# Patient Record
Sex: MALE | Race: WHITE | NOT HISPANIC OR LATINO | Employment: OTHER | URBAN - METROPOLITAN AREA
[De-identification: names, ages, dates, MRNs, and addresses within clinical notes are randomized per-mention and may not be internally consistent; named-entity substitution may affect disease eponyms.]

---

## 2023-08-23 PROBLEM — I42.9 CARDIOMYOPATHY (MULTI): Status: ACTIVE | Noted: 2023-08-23

## 2023-08-23 PROBLEM — I10 HTN (HYPERTENSION): Status: ACTIVE | Noted: 2023-08-23

## 2023-08-23 PROBLEM — I48.20 CHRONIC ATRIAL FIBRILLATION (MULTI): Status: ACTIVE | Noted: 2023-08-23

## 2023-08-23 RX ORDER — AMLODIPINE BESYLATE 10 MG/1
10 TABLET ORAL DAILY
COMMUNITY
Start: 2019-04-29 | End: 2023-10-17 | Stop reason: SDUPTHER

## 2023-08-23 RX ORDER — NAPROXEN SODIUM 220 MG/1
81 TABLET, FILM COATED ORAL DAILY
COMMUNITY

## 2023-10-17 ENCOUNTER — OFFICE VISIT (OUTPATIENT)
Dept: CARDIOLOGY | Facility: CLINIC | Age: 83
End: 2023-10-17
Payer: MEDICARE

## 2023-10-17 VITALS
WEIGHT: 228 LBS | HEART RATE: 64 BPM | SYSTOLIC BLOOD PRESSURE: 126 MMHG | BODY MASS INDEX: 33.67 KG/M2 | DIASTOLIC BLOOD PRESSURE: 70 MMHG

## 2023-10-17 DIAGNOSIS — I48.0 PAROXYSMAL ATRIAL FIBRILLATION (MULTI): ICD-10-CM

## 2023-10-17 DIAGNOSIS — I51.81 STRESS-INDUCED CARDIOMYOPATHY: ICD-10-CM

## 2023-10-17 DIAGNOSIS — I10 PRIMARY HYPERTENSION: Primary | ICD-10-CM

## 2023-10-17 PROCEDURE — 3074F SYST BP LT 130 MM HG: CPT | Performed by: INTERNAL MEDICINE

## 2023-10-17 PROCEDURE — 99213 OFFICE O/P EST LOW 20 MIN: CPT | Performed by: INTERNAL MEDICINE

## 2023-10-17 PROCEDURE — 1159F MED LIST DOCD IN RCRD: CPT | Performed by: INTERNAL MEDICINE

## 2023-10-17 PROCEDURE — 1036F TOBACCO NON-USER: CPT | Performed by: INTERNAL MEDICINE

## 2023-10-17 PROCEDURE — 3078F DIAST BP <80 MM HG: CPT | Performed by: INTERNAL MEDICINE

## 2023-10-17 PROCEDURE — 1160F RVW MEDS BY RX/DR IN RCRD: CPT | Performed by: INTERNAL MEDICINE

## 2023-10-17 PROCEDURE — 1126F AMNT PAIN NOTED NONE PRSNT: CPT | Performed by: INTERNAL MEDICINE

## 2023-10-17 RX ORDER — AMLODIPINE BESYLATE 10 MG/1
10 TABLET ORAL DAILY
Qty: 30 TABLET | Refills: 11 | Status: SHIPPED | OUTPATIENT
Start: 2023-10-17 | End: 2024-10-16

## 2023-10-17 ASSESSMENT — PATIENT HEALTH QUESTIONNAIRE - PHQ9
SUM OF ALL RESPONSES TO PHQ9 QUESTIONS 1 & 2: 0
1. LITTLE INTEREST OR PLEASURE IN DOING THINGS: NOT AT ALL
2. FEELING DOWN, DEPRESSED OR HOPELESS: NOT AT ALL

## 2023-10-17 ASSESSMENT — ENCOUNTER SYMPTOMS
DEPRESSION: 0
OCCASIONAL FEELINGS OF UNSTEADINESS: 0
LOSS OF SENSATION IN FEET: 0

## 2023-10-17 ASSESSMENT — PAIN SCALES - GENERAL: PAINLEVEL: 0-NO PAIN

## 2023-10-17 NOTE — PROGRESS NOTES
Subjective   Akbar Espitia is a 83 y.o. male.    Chief Complaint:  Follow-up (6 mo follow up Afib)    HPI  Patient stable and doing well.  No palpitations or unusual shortness of breath.  Review of Systems   All other systems reviewed and are negative.      Objective   Constitutional:       Appearance: Not in distress.   Eyes:      Conjunctiva/sclera: Conjunctivae normal.   Neck:      Vascular: JVD normal.   Pulmonary:      Breath sounds: Normal breath sounds. No wheezing. No rhonchi. No rales.   Cardiovascular:      Normal rate. Regular rhythm.      Murmurs: There is no murmur.      No gallop.  No click. No rub.   Abdominal:      Palpations: Abdomen is soft.   Neurological:      General: No focal deficit present.      Mental Status: Alert.         Lab Review:       Assessment/Plan   The primary encounter diagnosis was Primary hypertension. Diagnoses of Paroxysmal atrial fibrillation (CMS/HCC) and Stress-induced cardiomyopathy were also pertinent to this visit.    Cardiomyopathy (CMS/HCC)  Clinically stable with no unusual shortness of breath.    HTN (hypertension)  Blood pressure well controlled on the amlodipine, will continue.    Paroxysmal atrial fibrillation (CMS/HCC)  Remains asymptomatic.  Atrial fibrillation likely in the setting of the cardiomyopathy which has resolved.  He will unlikely to recur at this time.  Anticoagulation not recommended.

## 2023-10-17 NOTE — ASSESSMENT & PLAN NOTE
Remains asymptomatic.  Atrial fibrillation likely in the setting of the cardiomyopathy which has resolved.  He will unlikely to recur at this time.  Anticoagulation not recommended.

## 2024-04-24 PROBLEM — I48.20 CHRONIC ATRIAL FIBRILLATION (MULTI): Status: RESOLVED | Noted: 2023-08-23 | Resolved: 2024-04-24

## 2024-04-24 NOTE — ASSESSMENT & PLAN NOTE
Patient had Afib in setting of Takotsubo type cardiomyopathy.  We have felt risk of reoccurrence of Afib low given recovery of LV function and have not placed anticoagulation therapy.

## 2024-04-25 ENCOUNTER — OFFICE VISIT (OUTPATIENT)
Dept: CARDIOLOGY | Facility: CLINIC | Age: 84
End: 2024-04-25
Payer: MEDICARE

## 2024-04-25 VITALS
BODY MASS INDEX: 33.97 KG/M2 | WEIGHT: 230 LBS | DIASTOLIC BLOOD PRESSURE: 70 MMHG | HEART RATE: 76 BPM | SYSTOLIC BLOOD PRESSURE: 120 MMHG

## 2024-04-25 DIAGNOSIS — I10 PRIMARY HYPERTENSION: ICD-10-CM

## 2024-04-25 DIAGNOSIS — I48.0 PAROXYSMAL ATRIAL FIBRILLATION (MULTI): ICD-10-CM

## 2024-04-25 DIAGNOSIS — I51.81 STRESS-INDUCED CARDIOMYOPATHY: Primary | ICD-10-CM

## 2024-04-25 PROCEDURE — 1036F TOBACCO NON-USER: CPT | Performed by: INTERNAL MEDICINE

## 2024-04-25 PROCEDURE — 1159F MED LIST DOCD IN RCRD: CPT | Performed by: INTERNAL MEDICINE

## 2024-04-25 PROCEDURE — 1126F AMNT PAIN NOTED NONE PRSNT: CPT | Performed by: INTERNAL MEDICINE

## 2024-04-25 PROCEDURE — 99213 OFFICE O/P EST LOW 20 MIN: CPT | Performed by: INTERNAL MEDICINE

## 2024-04-25 PROCEDURE — 3078F DIAST BP <80 MM HG: CPT | Performed by: INTERNAL MEDICINE

## 2024-04-25 PROCEDURE — 3074F SYST BP LT 130 MM HG: CPT | Performed by: INTERNAL MEDICINE

## 2024-04-25 RX ORDER — AMLODIPINE BESYLATE 10 MG/1
10 TABLET ORAL DAILY
Qty: 30 TABLET | Refills: 11 | Status: SHIPPED | OUTPATIENT
Start: 2024-04-25 | End: 2025-04-25

## 2024-04-25 ASSESSMENT — ENCOUNTER SYMPTOMS
SHORTNESS OF BREATH: 0
DYSURIA: 0
PALPITATIONS: 0
OCCASIONAL FEELINGS OF UNSTEADINESS: 0
ABDOMINAL PAIN: 0
DEPRESSION: 0
NUMBNESS: 0
COUGH: 0
LOSS OF SENSATION IN FEET: 0
HEMATURIA: 0
BLURRED VISION: 0
PARESTHESIAS: 0
DYSPNEA ON EXERTION: 0

## 2024-04-25 ASSESSMENT — PAIN SCALES - GENERAL: PAINLEVEL: 0-NO PAIN

## 2024-04-25 ASSESSMENT — PATIENT HEALTH QUESTIONNAIRE - PHQ9
SUM OF ALL RESPONSES TO PHQ9 QUESTIONS 1 AND 2: 0
1. LITTLE INTEREST OR PLEASURE IN DOING THINGS: NOT AT ALL
2. FEELING DOWN, DEPRESSED OR HOPELESS: NOT AT ALL

## 2024-04-25 NOTE — PROGRESS NOTES
Subjective   Akbar Espitia is a 83 y.o. male.    Chief Complaint:  Follow-up    HPI  Overall patient doing very well.  No unusual shortness of breath.  No orthopnea, no paroxysmal nocturnal dyspnea.  States physically active, mowed his grass this morning.    Review of Systems   Constitutional: Negative for malaise/fatigue.   HENT:  Negative for congestion.    Eyes:  Negative for blurred vision.   Cardiovascular:  Negative for chest pain, dyspnea on exertion and palpitations.   Respiratory:  Negative for cough and shortness of breath.    Musculoskeletal:  Negative for joint pain.   Gastrointestinal:  Negative for abdominal pain.   Genitourinary:  Negative for dysuria and hematuria.   Neurological:  Negative for numbness and paresthesias.       Objective   Constitutional:       Appearance: Not in distress.   Eyes:      Conjunctiva/sclera: Conjunctivae normal.   Neck:      Vascular: JVD normal.   Pulmonary:      Breath sounds: Normal breath sounds. No wheezing. No rhonchi. No rales.   Cardiovascular:      Normal rate. Regular rhythm.      Murmurs: There is no murmur.      No gallop.  No click. No rub.   Abdominal:      Palpations: Abdomen is soft.   Neurological:      General: No focal deficit present.      Mental Status: Alert.         Lab Review:       Assessment/Plan   The primary encounter diagnosis was Stress-induced cardiomyopathy. Diagnoses of Paroxysmal atrial fibrillation (Multi) and Primary hypertension were also pertinent to this visit.    Paroxysmal atrial fibrillation (Multi)  Patient had Afib in setting of Takotsubo type cardiomyopathy.  We have felt risk of reoccurrence of Afib low given recovery of LV function and have not placed anticoagulation therapy.    Cardiomyopathy (Multi)  Doing very well from a cardiac standpoint.  Describes no unusual shortness of breath.  Will follow on a clinical basis.    HTN (hypertension)  Blood pressure currently well-controlled with the amlodipine 10 mg daily.  Will  continue and follow blood pressures on a regular basis.

## 2024-04-25 NOTE — ASSESSMENT & PLAN NOTE
Doing very well from a cardiac standpoint.  Describes no unusual shortness of breath.  Will follow on a clinical basis.

## 2024-04-25 NOTE — ASSESSMENT & PLAN NOTE
Blood pressure currently well-controlled with the amlodipine 10 mg daily.  Will continue and follow blood pressures on a regular basis.

## 2024-10-25 ENCOUNTER — APPOINTMENT (OUTPATIENT)
Dept: CARDIOLOGY | Facility: CLINIC | Age: 84
End: 2024-10-25
Payer: MEDICARE

## 2024-10-29 ENCOUNTER — TELEPHONE (OUTPATIENT)
Dept: CARDIOLOGY | Facility: CLINIC | Age: 84
End: 2024-10-29
Payer: MEDICARE

## 2024-10-29 DIAGNOSIS — I10 PRIMARY HYPERTENSION: ICD-10-CM

## 2024-10-29 RX ORDER — AMLODIPINE BESYLATE 10 MG/1
10 TABLET ORAL DAILY
Qty: 90 TABLET | Refills: 3 | OUTPATIENT
Start: 2024-10-29 | End: 2025-10-29

## 2024-10-29 RX ORDER — AMLODIPINE BESYLATE 10 MG/1
10 TABLET ORAL DAILY
Qty: 90 TABLET | Refills: 3 | Status: SHIPPED | OUTPATIENT
Start: 2024-10-29 | End: 2025-10-29

## 2024-11-06 ENCOUNTER — OFFICE VISIT (OUTPATIENT)
Dept: CARDIOLOGY | Facility: CLINIC | Age: 84
End: 2024-11-06
Payer: MEDICARE

## 2024-11-06 VITALS
HEART RATE: 92 BPM | SYSTOLIC BLOOD PRESSURE: 116 MMHG | BODY MASS INDEX: 35 KG/M2 | WEIGHT: 237 LBS | DIASTOLIC BLOOD PRESSURE: 74 MMHG | OXYGEN SATURATION: 98 %

## 2024-11-06 DIAGNOSIS — I51.81 STRESS-INDUCED CARDIOMYOPATHY: Primary | ICD-10-CM

## 2024-11-06 DIAGNOSIS — I48.0 PAROXYSMAL ATRIAL FIBRILLATION (MULTI): ICD-10-CM

## 2024-11-06 DIAGNOSIS — I10 PRIMARY HYPERTENSION: ICD-10-CM

## 2024-11-06 PROCEDURE — 3078F DIAST BP <80 MM HG: CPT | Performed by: INTERNAL MEDICINE

## 2024-11-06 PROCEDURE — 99213 OFFICE O/P EST LOW 20 MIN: CPT | Performed by: INTERNAL MEDICINE

## 2024-11-06 PROCEDURE — 1126F AMNT PAIN NOTED NONE PRSNT: CPT | Performed by: INTERNAL MEDICINE

## 2024-11-06 PROCEDURE — 1036F TOBACCO NON-USER: CPT | Performed by: INTERNAL MEDICINE

## 2024-11-06 PROCEDURE — 1159F MED LIST DOCD IN RCRD: CPT | Performed by: INTERNAL MEDICINE

## 2024-11-06 PROCEDURE — 3074F SYST BP LT 130 MM HG: CPT | Performed by: INTERNAL MEDICINE

## 2024-11-06 ASSESSMENT — ENCOUNTER SYMPTOMS
PALPITATIONS: 0
DEPRESSION: 0
DYSURIA: 0
ABDOMINAL PAIN: 0
OCCASIONAL FEELINGS OF UNSTEADINESS: 0
LOSS OF SENSATION IN FEET: 0
HEMATURIA: 0
SHORTNESS OF BREATH: 0
PARESTHESIAS: 0
DYSPNEA ON EXERTION: 0
COUGH: 0
BLURRED VISION: 0
NUMBNESS: 0

## 2024-11-06 ASSESSMENT — PATIENT HEALTH QUESTIONNAIRE - PHQ9
1. LITTLE INTEREST OR PLEASURE IN DOING THINGS: NOT AT ALL
2. FEELING DOWN, DEPRESSED OR HOPELESS: NOT AT ALL
SUM OF ALL RESPONSES TO PHQ9 QUESTIONS 1 AND 2: 0

## 2024-11-06 ASSESSMENT — PAIN SCALES - GENERAL: PAINLEVEL_OUTOF10: 0-NO PAIN

## 2024-11-06 NOTE — ASSESSMENT & PLAN NOTE
Clinically doing very well.  Nothing to suggest a reoccurrence of the Takotsubo cardiomyopathy.  Will follow on a clinical basis.

## 2024-11-06 NOTE — ASSESSMENT & PLAN NOTE
Blood pressure currently well-controlled on the simple amlodipine therapy which will be continued.

## 2024-11-06 NOTE — ASSESSMENT & PLAN NOTE
Atrial fibrillation in the setting of a Takotsubo cardiomyopathy.  Left ventricular systolic function returned to normal and we have not added anticoagulation as felt risk of recurrence was low.

## 2024-11-06 NOTE — PROGRESS NOTES
Subjective   Akbar Espitia is a 84 y.o. male.    Chief Complaint:  6 month f/u    HPI  Patient feels well.  No chest pain or palpitations.  Tries to stay physically active without significant restrictions.    Review of Systems   Constitutional: Negative for malaise/fatigue.   HENT:  Negative for congestion.    Eyes:  Negative for blurred vision.   Cardiovascular:  Negative for chest pain, dyspnea on exertion and palpitations.   Respiratory:  Negative for cough and shortness of breath.    Musculoskeletal:  Negative for joint pain.   Gastrointestinal:  Negative for abdominal pain.   Genitourinary:  Negative for dysuria and hematuria.   Neurological:  Negative for numbness and paresthesias.       Objective   Constitutional:       Appearance: Not in distress.   Eyes:      Conjunctiva/sclera: Conjunctivae normal.   Neck:      Vascular: JVD normal.   Pulmonary:      Breath sounds: Normal breath sounds. No wheezing. No rhonchi. No rales.   Cardiovascular:      Normal rate. Regular rhythm.      Murmurs: There is no murmur.      No gallop.  No click. No rub.   Abdominal:      Palpations: Abdomen is soft.   Neurological:      General: No focal deficit present.      Mental Status: Alert.         Lab Review:       Assessment/Plan   The primary encounter diagnosis was Stress-induced cardiomyopathy. Diagnoses of Primary hypertension and Paroxysmal atrial fibrillation (Multi) were also pertinent to this visit.    Paroxysmal atrial fibrillation (Multi)  Atrial fibrillation in the setting of a Takotsubo cardiomyopathy.  Left ventricular systolic function returned to normal and we have not added anticoagulation as felt risk of recurrence was low.    Cardiomyopathy (Multi)  Clinically doing very well.  Nothing to suggest a reoccurrence of the Takotsubo cardiomyopathy.  Will follow on a clinical basis.    HTN (hypertension)  Blood pressure currently well-controlled on the simple amlodipine therapy which will be continued.

## 2025-05-07 ENCOUNTER — APPOINTMENT (OUTPATIENT)
Facility: CLINIC | Age: 85
End: 2025-05-07
Payer: MEDICARE

## 2025-05-07 NOTE — ASSESSMENT & PLAN NOTE
Atrial fibrillation occurred in the setting of a Takotsubo cardiomyopathy.  With resolution of the cardiomyopathy we have not added anticoagulation therapy.  He has had no symptomatic recurrences and we will continue to monitor on a clinical basis off of anticoagulation therapy.

## 2025-05-15 ENCOUNTER — OFFICE VISIT (OUTPATIENT)
Facility: CLINIC | Age: 85
End: 2025-05-15
Payer: MEDICARE

## 2025-05-15 VITALS
WEIGHT: 237 LBS | HEART RATE: 90 BPM | OXYGEN SATURATION: 98 % | DIASTOLIC BLOOD PRESSURE: 68 MMHG | SYSTOLIC BLOOD PRESSURE: 120 MMHG | BODY MASS INDEX: 35 KG/M2

## 2025-05-15 DIAGNOSIS — I10 PRIMARY HYPERTENSION: ICD-10-CM

## 2025-05-15 DIAGNOSIS — I51.81 STRESS-INDUCED CARDIOMYOPATHY: ICD-10-CM

## 2025-05-15 DIAGNOSIS — I42.9 CARDIOMYOPATHY, UNSPECIFIED TYPE (MULTI): ICD-10-CM

## 2025-05-15 DIAGNOSIS — I48.0 PAROXYSMAL ATRIAL FIBRILLATION (MULTI): Primary | ICD-10-CM

## 2025-05-15 PROCEDURE — 1159F MED LIST DOCD IN RCRD: CPT | Performed by: INTERNAL MEDICINE

## 2025-05-15 PROCEDURE — 99213 OFFICE O/P EST LOW 20 MIN: CPT | Performed by: INTERNAL MEDICINE

## 2025-05-15 PROCEDURE — 1036F TOBACCO NON-USER: CPT | Performed by: INTERNAL MEDICINE

## 2025-05-15 PROCEDURE — 3074F SYST BP LT 130 MM HG: CPT | Performed by: INTERNAL MEDICINE

## 2025-05-15 PROCEDURE — 1126F AMNT PAIN NOTED NONE PRSNT: CPT | Performed by: INTERNAL MEDICINE

## 2025-05-15 PROCEDURE — 3078F DIAST BP <80 MM HG: CPT | Performed by: INTERNAL MEDICINE

## 2025-05-15 ASSESSMENT — ENCOUNTER SYMPTOMS
DEPRESSION: 0
NUMBNESS: 0
SHORTNESS OF BREATH: 0
OCCASIONAL FEELINGS OF UNSTEADINESS: 0
COUGH: 0
PALPITATIONS: 0
PARESTHESIAS: 0
LOSS OF SENSATION IN FEET: 0
DYSPNEA ON EXERTION: 0
ABDOMINAL PAIN: 0
HEMATURIA: 0
BLURRED VISION: 0
DYSURIA: 0

## 2025-05-15 ASSESSMENT — PAIN SCALES - GENERAL: PAINLEVEL_OUTOF10: 0-NO PAIN

## 2025-05-15 ASSESSMENT — PATIENT HEALTH QUESTIONNAIRE - PHQ9
2. FEELING DOWN, DEPRESSED OR HOPELESS: NOT AT ALL
1. LITTLE INTEREST OR PLEASURE IN DOING THINGS: NOT AT ALL
SUM OF ALL RESPONSES TO PHQ9 QUESTIONS 1 AND 2: 0

## 2025-05-15 ASSESSMENT — LIFESTYLE VARIABLES: TOTAL SCORE: 0

## 2025-05-15 NOTE — PROGRESS NOTES
Subjective   Akbar Espitia is a 84 y.o. male.    Chief Complaint:  6 month follow up    HPI  84-year-old male with a previous history of a Takotsubo cardiomyopathy and paroxysmal atrial fibrillation reports for a cardiac follow-up visit.  He states he has been doing well.  Describes no chest pain or anginal symptoms.  No unusual shortness of breath.  He has not felt any palpitations.    Review of Systems   Constitutional: Negative for malaise/fatigue.   HENT:  Negative for congestion.    Eyes:  Negative for blurred vision.   Cardiovascular:  Negative for chest pain, dyspnea on exertion and palpitations.   Respiratory:  Negative for cough and shortness of breath.    Musculoskeletal:  Negative for joint pain.   Gastrointestinal:  Negative for abdominal pain.   Genitourinary:  Negative for dysuria and hematuria.   Neurological:  Negative for numbness and paresthesias.       Objective   Constitutional:       Appearance: Not in distress.   Eyes:      Conjunctiva/sclera: Conjunctivae normal.   Neck:      Vascular: JVD normal.   Pulmonary:      Breath sounds: Normal breath sounds. No wheezing. No rhonchi. No rales.   Cardiovascular:      Normal rate. Regular rhythm.      Murmurs: There is no murmur.      No gallop.  No click. No rub.   Edema:     Pretibial: bilateral trace edema of the pretibial area.     Ankle: bilateral trace edema of the ankle.     Feet: bilateral trace edema of the feet.  Abdominal:      Palpations: Abdomen is soft.   Neurological:      General: No focal deficit present.      Mental Status: Alert.         Lab Review:       Assessment/Plan   The primary encounter diagnosis was Paroxysmal atrial fibrillation (Multi). Diagnoses of Primary hypertension and Stress-induced cardiomyopathy were also pertinent to this visit.    Paroxysmal atrial fibrillation (Multi)  Atrial fibrillation occurred in the setting of a Takotsubo cardiomyopathy.  With resolution of the cardiomyopathy we have not added anticoagulation  therapy.  He has had no symptomatic recurrences and we will continue to monitor on a clinical basis off of anticoagulation therapy.    HTN (hypertension)  Blood pressure well-controlled on amlodipine therapy, no change necessary.    Cardiomyopathy (Multi)  Resolved cardiomyopathy and no symptomatic recurrences.  Will continue to monitor on a clinical basis.

## 2025-05-15 NOTE — ASSESSMENT & PLAN NOTE
Resolved cardiomyopathy and no symptomatic recurrences.  Will continue to monitor on a clinical basis.